# Patient Record
Sex: FEMALE | Race: WHITE
[De-identification: names, ages, dates, MRNs, and addresses within clinical notes are randomized per-mention and may not be internally consistent; named-entity substitution may affect disease eponyms.]

---

## 2018-02-07 ENCOUNTER — HOSPITAL ENCOUNTER (OUTPATIENT)
Dept: HOSPITAL 62 - LAB | Age: 33
End: 2018-02-07
Attending: FAMILY MEDICINE
Payer: COMMERCIAL

## 2018-02-07 DIAGNOSIS — Z32.00: Primary | ICD-10-CM

## 2018-02-07 PROCEDURE — 36415 COLL VENOUS BLD VENIPUNCTURE: CPT

## 2018-02-07 PROCEDURE — 84702 CHORIONIC GONADOTROPIN TEST: CPT

## 2018-02-24 ENCOUNTER — HOSPITAL ENCOUNTER (OUTPATIENT)
Dept: HOSPITAL 62 - OD | Age: 33
End: 2018-02-24
Attending: MIDWIFE
Payer: COMMERCIAL

## 2018-02-24 DIAGNOSIS — N91.2: Primary | ICD-10-CM

## 2018-02-24 PROCEDURE — 36415 COLL VENOUS BLD VENIPUNCTURE: CPT

## 2018-02-24 PROCEDURE — 84702 CHORIONIC GONADOTROPIN TEST: CPT

## 2018-04-02 ENCOUNTER — HOSPITAL ENCOUNTER (OUTPATIENT)
Dept: HOSPITAL 62 - OD | Age: 33
End: 2018-04-02
Attending: OBSTETRICS & GYNECOLOGY
Payer: COMMERCIAL

## 2018-04-02 DIAGNOSIS — Z34.01: Primary | ICD-10-CM

## 2018-04-02 DIAGNOSIS — Z11.3: ICD-10-CM

## 2018-04-02 DIAGNOSIS — Z13.0: ICD-10-CM

## 2018-04-02 DIAGNOSIS — Z13.29: ICD-10-CM

## 2018-04-02 LAB
ADD MANUAL DIFF: NO
BASOPHILS # BLD AUTO: 0 10^3/UL (ref 0–0.2)
BASOPHILS NFR BLD AUTO: 0.2 % (ref 0–2)
EOSINOPHIL # BLD AUTO: 0.1 10^3/UL (ref 0–0.6)
EOSINOPHIL NFR BLD AUTO: 1 % (ref 0–6)
ERYTHROCYTE [DISTWIDTH] IN BLOOD BY AUTOMATED COUNT: 12.6 % (ref 11.5–14)
HCT VFR BLD CALC: 39.2 % (ref 36–47)
HGB BLD-MCNC: 13.9 G/DL (ref 12–15.5)
LYMPHOCYTES # BLD AUTO: 1.2 10^3/UL (ref 0.5–4.7)
LYMPHOCYTES NFR BLD AUTO: 18 % (ref 13–45)
MCH RBC QN AUTO: 30.7 PG (ref 27–33.4)
MCHC RBC AUTO-ENTMCNC: 35.5 G/DL (ref 32–36)
MCV RBC AUTO: 86 FL (ref 80–97)
MONOCYTES # BLD AUTO: 0.6 10^3/UL (ref 0.1–1.4)
MONOCYTES NFR BLD AUTO: 9.4 % (ref 3–13)
NEUTROPHILS # BLD AUTO: 4.9 10^3/UL (ref 1.7–8.2)
NEUTS SEG NFR BLD AUTO: 71.4 % (ref 42–78)
PLATELET # BLD: 304 10^3/UL (ref 150–450)
RBC # BLD AUTO: 4.54 10^6/UL (ref 3.72–5.28)
RUBELLA INTERPRETATION: POSITIVE
RUBV IGG SER-ACNC: 38.1 IU/ML
TOTAL CELLS COUNTED % (AUTO): 100 %
WBC # BLD AUTO: 6.8 10^3/UL (ref 4–10.5)

## 2018-04-02 PROCEDURE — 86701 HIV-1ANTIBODY: CPT

## 2018-04-02 PROCEDURE — 86592 SYPHILIS TEST NON-TREP QUAL: CPT

## 2018-04-02 PROCEDURE — 86804 HEP C AB TEST CONFIRM: CPT

## 2018-04-02 PROCEDURE — 83020 HEMOGLOBIN ELECTROPHORESIS: CPT

## 2018-04-02 PROCEDURE — 85025 COMPLETE CBC W/AUTO DIFF WBC: CPT

## 2018-04-02 PROCEDURE — 86901 BLOOD TYPING SEROLOGIC RH(D): CPT

## 2018-04-02 PROCEDURE — 86900 BLOOD TYPING SEROLOGIC ABO: CPT

## 2018-04-02 PROCEDURE — 86850 RBC ANTIBODY SCREEN: CPT

## 2018-04-02 PROCEDURE — 84443 ASSAY THYROID STIM HORMONE: CPT

## 2018-04-02 PROCEDURE — 84439 ASSAY OF FREE THYROXINE: CPT

## 2018-04-02 PROCEDURE — 36415 COLL VENOUS BLD VENIPUNCTURE: CPT

## 2018-04-02 PROCEDURE — 86803 HEPATITIS C AB TEST: CPT

## 2018-04-02 PROCEDURE — 86762 RUBELLA ANTIBODY: CPT

## 2018-04-02 PROCEDURE — 87340 HEPATITIS B SURFACE AG IA: CPT

## 2018-04-03 LAB
HCV AB SER IA-ACNC: <0.1 S/CO RATIO (ref 0–0.9)
HGB A MFR BLD: 97.1 % (ref 96.4–98.8)
HGB A2 MFR BLD: 2.9 % (ref 1.8–3.2)
HGB C MFR BLD: 0 %
HGB F: 0 % (ref 0–2)
HGB S MFR BLD: 0 %

## 2018-04-12 ENCOUNTER — HOSPITAL ENCOUNTER (OUTPATIENT)
Dept: HOSPITAL 62 - OD | Age: 33
End: 2018-04-12
Attending: MIDWIFE
Payer: COMMERCIAL

## 2018-04-12 DIAGNOSIS — Z36.89: Primary | ICD-10-CM

## 2018-04-12 PROCEDURE — 84702 CHORIONIC GONADOTROPIN TEST: CPT

## 2018-04-12 PROCEDURE — 36415 COLL VENOUS BLD VENIPUNCTURE: CPT

## 2018-04-12 PROCEDURE — 86336 INHIBIN A: CPT

## 2018-04-12 PROCEDURE — 84163 PAPPA SERUM: CPT

## 2018-04-17 LAB
FET CRL US.MEAS: (no result) CM
FET CRL US.MEAS: 70 MM
HCG MOM SERPL: 2.84
HCG SERPL-ACNC: 232.1 IU/ML
INHIBIN A ADJ MOM SERPL: 3.51
INHIBIN A SERPL-MCNC: 804 PG/ML
PAPP-A MOM SERPL: 0.56
PAPP-A SERPL-MCNC: 637.5 NG/ML
TS 18 RISK FETUS: (no result)

## 2018-07-26 ENCOUNTER — HOSPITAL ENCOUNTER (OUTPATIENT)
Dept: HOSPITAL 62 - LAB | Age: 33
End: 2018-07-26
Attending: OBSTETRICS & GYNECOLOGY
Payer: COMMERCIAL

## 2018-07-26 DIAGNOSIS — Z34.03: Primary | ICD-10-CM

## 2018-07-26 PROCEDURE — 86592 SYPHILIS TEST NON-TREP QUAL: CPT

## 2018-07-26 PROCEDURE — 36415 COLL VENOUS BLD VENIPUNCTURE: CPT

## 2018-07-26 PROCEDURE — 86701 HIV-1ANTIBODY: CPT

## 2018-10-19 ENCOUNTER — HOSPITAL ENCOUNTER (OUTPATIENT)
Dept: HOSPITAL 62 - LC | Age: 33
Discharge: HOME | End: 2018-10-19
Attending: OBSTETRICS & GYNECOLOGY
Payer: COMMERCIAL

## 2018-10-19 DIAGNOSIS — Z3A.39: ICD-10-CM

## 2018-10-19 DIAGNOSIS — O14.93: Primary | ICD-10-CM

## 2018-10-19 LAB
ADD MANUAL DIFF: NO
ALBUMIN SERPL-MCNC: 3.1 G/DL (ref 3.5–5)
ALP SERPL-CCNC: 134 U/L (ref 38–126)
ALT SERPL-CCNC: 12 U/L (ref 9–52)
ANION GAP SERPL CALC-SCNC: 10 MMOL/L (ref 5–19)
APPEARANCE UR: (no result)
APTT PPP: YELLOW S
AST SERPL-CCNC: 21 U/L (ref 14–36)
BARBITURATES UR QL SCN: NEGATIVE
BASOPHILS # BLD AUTO: 0 10^3/UL (ref 0–0.2)
BASOPHILS NFR BLD AUTO: 0.4 % (ref 0–2)
BILIRUB DIRECT SERPL-MCNC: 0.1 MG/DL (ref 0–0.4)
BILIRUB SERPL-MCNC: 0.9 MG/DL (ref 0.2–1.3)
BILIRUB UR QL STRIP: NEGATIVE
BUN SERPL-MCNC: 12 MG/DL (ref 7–20)
CALCIUM: 9.9 MG/DL (ref 8.4–10.2)
CHLORIDE SERPL-SCNC: 106 MMOL/L (ref 98–107)
CO2 SERPL-SCNC: 22 MMOL/L (ref 22–30)
CREAT UR-MCNC: 127.2 MG/DL (ref 16–327)
EOSINOPHIL # BLD AUTO: 0 10^3/UL (ref 0–0.6)
EOSINOPHIL NFR BLD AUTO: 0.5 % (ref 0–6)
ERYTHROCYTE [DISTWIDTH] IN BLOOD BY AUTOMATED COUNT: 15.4 % (ref 11.5–14)
GLUCOSE SERPL-MCNC: 74 MG/DL (ref 75–110)
GLUCOSE UR STRIP-MCNC: NEGATIVE MG/DL
HCT VFR BLD CALC: 32.1 % (ref 36–47)
HGB BLD-MCNC: 10.9 G/DL (ref 12–15.5)
KETONES UR STRIP-MCNC: (no result) MG/DL
LYMPHOCYTES # BLD AUTO: 1 10^3/UL (ref 0.5–4.7)
LYMPHOCYTES NFR BLD AUTO: 15.1 % (ref 13–45)
MCH RBC QN AUTO: 28.3 PG (ref 27–33.4)
MCHC RBC AUTO-ENTMCNC: 34 G/DL (ref 32–36)
MCV RBC AUTO: 83 FL (ref 80–97)
METHADONE UR QL SCN: NEGATIVE
MONOCYTES # BLD AUTO: 0.6 10^3/UL (ref 0.1–1.4)
MONOCYTES NFR BLD AUTO: 9.2 % (ref 3–13)
NEUTROPHILS # BLD AUTO: 4.7 10^3/UL (ref 1.7–8.2)
NEUTS SEG NFR BLD AUTO: 74.8 % (ref 42–78)
NITRITE UR QL STRIP: NEGATIVE
PCP UR QL SCN: NEGATIVE
PH UR STRIP: 6 [PH] (ref 5–9)
PLATELET # BLD: 260 10^3/UL (ref 150–450)
POTASSIUM SERPL-SCNC: 5.2 MMOL/L (ref 3.6–5)
PROT SERPL-MCNC: 6 G/DL (ref 6.3–8.2)
PROT UR STRIP-MCNC: 10.6 MG/DL (ref ?–12)
PROT UR STRIP-MCNC: NEGATIVE MG/DL
RBC # BLD AUTO: 3.86 10^6/UL (ref 3.72–5.28)
SODIUM SERPL-SCNC: 137.6 MMOL/L (ref 137–145)
SP GR UR STRIP: 1.02
TOTAL CELLS COUNTED % (AUTO): 100 %
UR PRO/CREAT RATIO RESULT: 0.1 MG/MG (ref 0–0.2)
URATE SERPL-MCNC: 6 MG/DL (ref 2.5–6.2)
URINE AMPHETAMINES SCREEN: NEGATIVE
URINE BENZODIAZEPINES SCREEN: NEGATIVE
URINE COCAINE SCREEN: NEGATIVE
URINE MARIJUANA (THC) SCREEN: NEGATIVE
UROBILINOGEN UR-MCNC: NEGATIVE MG/DL (ref ?–2)
WBC # BLD AUTO: 6.3 10^3/UL (ref 4–10.5)

## 2018-10-19 PROCEDURE — 84156 ASSAY OF PROTEIN URINE: CPT

## 2018-10-19 PROCEDURE — 80307 DRUG TEST PRSMV CHEM ANLYZR: CPT

## 2018-10-19 PROCEDURE — 80053 COMPREHEN METABOLIC PANEL: CPT

## 2018-10-19 PROCEDURE — 36415 COLL VENOUS BLD VENIPUNCTURE: CPT

## 2018-10-19 PROCEDURE — 59025 FETAL NON-STRESS TEST: CPT

## 2018-10-19 PROCEDURE — 4A1HXCZ MONITORING OF PRODUCTS OF CONCEPTION, CARDIAC RATE, EXTERNAL APPROACH: ICD-10-PCS | Performed by: OBSTETRICS & GYNECOLOGY

## 2018-10-19 PROCEDURE — 84550 ASSAY OF BLOOD/URIC ACID: CPT

## 2018-10-19 PROCEDURE — 82570 ASSAY OF URINE CREATININE: CPT

## 2018-10-19 PROCEDURE — 81005 URINALYSIS: CPT

## 2018-10-19 PROCEDURE — 83615 LACTATE (LD) (LDH) ENZYME: CPT

## 2018-10-19 PROCEDURE — 85025 COMPLETE CBC W/AUTO DIFF WBC: CPT

## 2018-10-19 NOTE — NON STRESS TEST REPORT
=================================================================

Non Stress Test

=================================================================

Datetime Report Generated by CPN: 10/19/2018 17:23

   

   

=================================================================

DEMOGRAPHIC

=================================================================

   

EGA NST:  39.1

   

=================================================================

INDICATION

=================================================================

   

Indication for Study:  Ordered by Provider

Indication for Study (NST) Other:  pih work up

   

=================================================================

MONITORING

=================================================================

   

Monitor Explained:  Monitor Explained; Test Explained; Patient

   Verbalized Understanding

Time on Monitor:  10/19/2018 16:25

Time off Monitor:  10/19/2018 16:46

NST Duration:  21

   

=================================================================

NST INTERVENTIONS

=================================================================

   

NST Interventions:  PO Hydration; Reposition Patient

Physician Notified NST:  Dr Younger

BABY A:  [id number]

   

=================================================================

BABY A

=================================================================

   

Fetal Movement :  Present

Contraction Frequency :  irreg

FHR Baseline :  120

Accelerations :  15X15

Decelerations :  None

Variability :  Moderate 6-25bpm

NST Review:  Meets Criteria for Reactive NST

NST Review and Verified By :  SHAYY Bellavance RN

NST Results:  Reactive

   

=================================================================

NST REPORT

=================================================================

   

Report Trigger:  Send Report

## 2018-10-19 NOTE — L&D PROGRESS NOTES
=================================================================

PROGRESS NOTES

=================================================================

Datetime Report Generated by CPN: 10/19/2018 16:10

   

   

=================================================================

PROGRESS NOTE

=================================================================

   

Impression Other:  elevated BP in office

Procedures- Other:  PIH work up

Plan:  Continue Present Management

Plan Other:  labs/monitor

Vital Signs :  Reviewed; Within Normal Limits

Vital Signs Comments:  ALL BPs normal

Comment:  All PIH labs normal; no elevated BPs in triage; pt

   asymptomatic

   

=================================================================

VAGINAL EXAM

=================================================================

   

Contractions:  irregular

   

=================================================================

FETUS A

=================================================================

   

FHR - Baseline:  120s

Variability:  Moderate 6-25bpm

Decelerations:  None

FHR Category:  Category I

:  39.1

   

=================================================================

SIGNATURE

=================================================================

   

SIGNATURE:  [id number]

Signature:  Electronically signed by Carly Jeffery MD ([email or web address]) on

   10/19/2018 at 16:09  with User ID: [email or web address]

## 2018-10-25 ENCOUNTER — HOSPITAL ENCOUNTER (INPATIENT)
Dept: HOSPITAL 62 - LR | Age: 33
LOS: 4 days | Discharge: HOME | End: 2018-10-29
Attending: OBSTETRICS & GYNECOLOGY | Admitting: OBSTETRICS & GYNECOLOGY
Payer: COMMERCIAL

## 2018-10-25 DIAGNOSIS — O48.0: Primary | ICD-10-CM

## 2018-10-25 DIAGNOSIS — D62: ICD-10-CM

## 2018-10-25 DIAGNOSIS — Z3A.40: ICD-10-CM

## 2018-10-25 DIAGNOSIS — O40.3XX0: ICD-10-CM

## 2018-10-25 LAB
ADD MANUAL DIFF: NO
BARBITURATES UR QL SCN: NEGATIVE
BASOPHILS # BLD AUTO: 0 10^3/UL (ref 0–0.2)
BASOPHILS NFR BLD AUTO: 0.4 % (ref 0–2)
EOSINOPHIL # BLD AUTO: 0 10^3/UL (ref 0–0.6)
EOSINOPHIL NFR BLD AUTO: 0.5 % (ref 0–6)
ERYTHROCYTE [DISTWIDTH] IN BLOOD BY AUTOMATED COUNT: 15.5 % (ref 11.5–14)
HCT VFR BLD CALC: 33.9 % (ref 36–47)
HGB BLD-MCNC: 11.6 G/DL (ref 12–15.5)
LYMPHOCYTES # BLD AUTO: 1.1 10^3/UL (ref 0.5–4.7)
LYMPHOCYTES NFR BLD AUTO: 14.6 % (ref 13–45)
MCH RBC QN AUTO: 28.6 PG (ref 27–33.4)
MCHC RBC AUTO-ENTMCNC: 34.4 G/DL (ref 32–36)
MCV RBC AUTO: 83 FL (ref 80–97)
METHADONE UR QL SCN: NEGATIVE
MONOCYTES # BLD AUTO: 0.8 10^3/UL (ref 0.1–1.4)
MONOCYTES NFR BLD AUTO: 9.8 % (ref 3–13)
NEUTROPHILS # BLD AUTO: 5.8 10^3/UL (ref 1.7–8.2)
NEUTS SEG NFR BLD AUTO: 74.7 % (ref 42–78)
PCP UR QL SCN: NEGATIVE
PLATELET # BLD: 283 10^3/UL (ref 150–450)
RBC # BLD AUTO: 4.07 10^6/UL (ref 3.72–5.28)
TOTAL CELLS COUNTED % (AUTO): 100 %
URINE AMPHETAMINES SCREEN: NEGATIVE
URINE BENZODIAZEPINES SCREEN: NEGATIVE
URINE COCAINE SCREEN: NEGATIVE
URINE MARIJUANA (THC) SCREEN: NEGATIVE
WBC # BLD AUTO: 7.7 10^3/UL (ref 4–10.5)

## 2018-10-25 PROCEDURE — 86850 RBC ANTIBODY SCREEN: CPT

## 2018-10-25 PROCEDURE — 86900 BLOOD TYPING SEROLOGIC ABO: CPT

## 2018-10-25 PROCEDURE — 86901 BLOOD TYPING SEROLOGIC RH(D): CPT

## 2018-10-25 PROCEDURE — 3E0P7VZ INTRODUCTION OF HORMONE INTO FEMALE REPRODUCTIVE, VIA NATURAL OR ARTIFICIAL OPENING: ICD-10-PCS | Performed by: OBSTETRICS & GYNECOLOGY

## 2018-10-25 PROCEDURE — 85025 COMPLETE CBC W/AUTO DIFF WBC: CPT

## 2018-10-25 PROCEDURE — 94760 N-INVAS EAR/PLS OXIMETRY 1: CPT

## 2018-10-25 PROCEDURE — 80307 DRUG TEST PRSMV CHEM ANLYZR: CPT

## 2018-10-25 PROCEDURE — 36415 COLL VENOUS BLD VENIPUNCTURE: CPT

## 2018-10-25 PROCEDURE — 88307 TISSUE EXAM BY PATHOLOGIST: CPT

## 2018-10-25 PROCEDURE — 4A1HXCZ MONITORING OF PRODUCTS OF CONCEPTION, CARDIAC RATE, EXTERNAL APPROACH: ICD-10-PCS | Performed by: OBSTETRICS & GYNECOLOGY

## 2018-10-25 PROCEDURE — 85027 COMPLETE CBC AUTOMATED: CPT

## 2018-10-25 PROCEDURE — 86592 SYPHILIS TEST NON-TREP QUAL: CPT

## 2018-10-26 PROCEDURE — 0HQ9XZZ REPAIR PERINEUM SKIN, EXTERNAL APPROACH: ICD-10-PCS | Performed by: OBSTETRICS & GYNECOLOGY

## 2018-10-26 RX ADMIN — SODIUM CHLORIDE, SODIUM LACTATE, POTASSIUM CHLORIDE, AND CALCIUM CHLORIDE PRN MLS/HR: .6; .31; .03; .02 INJECTION, SOLUTION INTRAVENOUS at 22:50

## 2018-10-26 RX ADMIN — SODIUM CHLORIDE, SODIUM LACTATE, POTASSIUM CHLORIDE, AND CALCIUM CHLORIDE PRN MLS/HR: .6; .31; .03; .02 INJECTION, SOLUTION INTRAVENOUS at 11:08

## 2018-10-26 NOTE — L&D PROGRESS NOTES
=================================================================

PROGRESS NOTES

=================================================================

Datetime Report Generated by CPN: 10/26/2018 08:25

   

   

=================================================================

PROGRESS NOTE

=================================================================

   

Impression:  Reassuring Fetal Heart Rate

Plan:  Cervical Ripening

Comment:  sono to check for presentation = vtx, cervidil remains in

   place, Cat 1 strip, comfortable, POC discussed

   

=================================================================

SIGNATURE

=================================================================

   

SIGNATURE:  [id number];[id number]

SIGNATURE:  [id number];[id number]

Assignment:  Earle Worthington MD

Signature:  Electronically signed by Kacy Larson CNM on 10/26/2018 at

   08:24  with User ID: [email or web address]

:  Electronically signed by Kacy Larson CNM on 10/26/2018 at 08:24  with

   User ID: [email or web address]

## 2018-10-26 NOTE — ADMISSION PHYSICAL
=================================================================



=================================================================

Datetime Report Generated by CPN: 10/26/2018 08:50

   

   

=================================================================

CURRENT ADMISSION

=================================================================

   

Prenatal Hx Assessment:  The Prenatal History has been Reviewed and is

   Current

Chief Complaint:  Scheduled Induction of Labor

Indication for Induction:  Gestational HTN; Indicated by Fetal Testing;

   Polyhydramnios

Admit Impression :  Postterm, Intrauterine Pregnancy; No Active Labor

Admit Plan:  Admit to Unit; Initiate Labor Induction Protocol

   

=================================================================

ALLERGIES

=================================================================

   

Medication Allergies:  No

Medication Allergies:  fish derived (10/19/2018)

Latex:  No Latex Allergies

Food Allergies:  fish

   

=================================================================

OBSTETRICAL HISTORY

=================================================================

   

EDC:  10/25/2018 00:00

:  1

Para:  0

Term:  0

:  0

SAB:  0

IAB:  0

Ectopic:  0

Livin

Cesareans:  0

VBACs:  0

Multiple Births:  0

Gestational Diabetes:  No

Rh Sensitization:  No

Incompetent Cervix:  No

MAMTA:  No

Infertility:  No

ART Treatment:  No

Uterine Anomaly:  No

IUGR:  No

Hx Previous C/S:  No

Macrosomia:  No

Hx Loss/Stillborn:  No

PIH:  No

Hx  Death:  No

Placenta Previa/Abruption:  No

Depression/PP Depression:  No

PTL/PROM:  No

Post Partum Hemorrhage:  No

Current Pregnancy Procedures:  Ultrasound; NST

   

=================================================================

***SEE PRENATAL RECORDS***

=================================================================

   

Alcohol:  No

Marijuana :  No

Cocaine:  No

Other Illicit Drugs:  No

Cigarettes:  Never Smoker. [id number]

   

=================================================================

MEDICAL HISTORY

=================================================================

   

Diabetes:  No

Blood Transfusion:  No

Pulmonary Disease (Asthma, TB):  No

Breast Disease:  No

Hypertension:  No

Gyn Surgery:  No

Heart Disease:  No

Hosp/Surgery:  Yes

Autoimmune Disorder:  No

Anesthetic Complications:  No

Kidney Disease:  No

Abnormal Pap Smear:  Yes

Neuro/Epilepsy:  No

Psychiatric Disorders:  No

Other Medical Diseases:  No

Hepatitis/Liver Disease:  No

Significant Family History:  No

Varicosities/Phlebitis:  No

Trauma/Violence :  No

Thyroid Dysfunction:  No

Medical History Comments:  Appendix removed 

   gall baldder removed  

   abnormal PAP 11 years ago  follow up normal

   

=================================================================

INFECTIOUS HISTORY

=================================================================

   

Gonorrhea:  No

Genital Herpes:  No

Chlamydia:  No

Tuberculosis:  No

Syphilis:  No

Hepatitis:  No

HIV/AIDS Exposure:  No

Rash or Viral Illness:  No

HPV:  No

   

=================================================================

PHYSICAL EXAM

=================================================================

   

General:  Normal

HEENT:  Normal

Neurologic:  Normal

Thyroid:  Normal

Heart:  Normal

Lungs:  Normal

Breast:  Deferred

Back:  Normal

Abdomen:  Normal

Genitourinary Exam:  Normal

Extremities:  Normal

DTRs:  Normal

Pelvic Type:  Adequate

Physical Exam Comments:  Polyhydramnios

   Abn nuchal, Informaseque nl

   GBS neg

   Labile BP's 

Vital Signs:  Reviewed

   

=================================================================

VAGINAL EXAM

=================================================================

   

Contraction Comments:  irregular

   

=================================================================

MEMBRANES

=================================================================

   

Membranes:  Intact

   

=================================================================

FETUS A

=================================================================

   

EGA:  40.1

Monitoring:  External US

Admit Comment:  Admitted to LD for IOL 40 weeks. labile BP's and Poly

   Cervidil placed

   Plan:  Pitocin later after Cervidil pulled

   POC discussed

   

=================================================================

PLANS FOR LABOR AND DELIVERY

=================================================================

   

Labor and Delivery:  None

Pain Management:  Epidural

Feeding Preference:  Breast

Benefit of Breast Feed Discussed:  Yes

Circumcision:  N/A

   

=================================================================

INFORMED CONSENT

=================================================================

   

Assignment:  Earle Worthington MD

Signature:  Electronically signed by Kacy Larson CNM on 10/26/2018 at

   08:49  with User ID: [email or web address]

:  Electronically signed by Kacy Larson CNM on 10/26/2018 at 08:49  with

   User ID: [email or web address]

## 2018-10-26 NOTE — L&D PROGRESS NOTES
=================================================================

PROGRESS NOTES

=================================================================

Datetime Report Generated by CPN: 10/26/2018 12:50

   

   

=================================================================

PROGRESS NOTE

=================================================================

   

Impression:  Reassuring Fetal Heart Rate

Plan:  Continue Present Management; Induction

Vital Signs :  Reviewed; Within Normal Limits

Comment:  Pitocin infusing, mild uc's, uc's q 2-3 min, Cat 1, hsb at

   BS, no c/o

   reviewed POC

   

=================================================================

MEMBRANES

=================================================================

   

Membranes:  Intact

   

=================================================================

FETUS A

=================================================================

   

:  40.0

   

=================================================================

FETUS C

=================================================================

   

SIGNATURE:  [id number];[id number];[id number]

SIGNATURE:  [id number];[id number];[id number]

Assignment:  Earle Worthington MD

Signature:  Electronically signed by Kacy Larson CNM on 10/26/2018 at

   12:50  with User ID: [email or web address]

:  Electronically signed by Kacy Larson CNM on 10/26/2018 at 12:50  with

   User ID: [email or web address]

## 2018-10-27 LAB
ADD MANUAL DIFF: NO
BASOPHILS # BLD AUTO: 0 10^3/UL (ref 0–0.2)
BASOPHILS NFR BLD AUTO: 0.3 % (ref 0–2)
EOSINOPHIL # BLD AUTO: 0 10^3/UL (ref 0–0.6)
EOSINOPHIL NFR BLD AUTO: 0.7 % (ref 0–6)
ERYTHROCYTE [DISTWIDTH] IN BLOOD BY AUTOMATED COUNT: 15.8 % (ref 11.5–14)
HCT VFR BLD CALC: 32.6 % (ref 36–47)
HGB BLD-MCNC: 11 G/DL (ref 12–15.5)
LYMPHOCYTES # BLD AUTO: 1 10^3/UL (ref 0.5–4.7)
LYMPHOCYTES NFR BLD AUTO: 14.7 % (ref 13–45)
MCH RBC QN AUTO: 28.1 PG (ref 27–33.4)
MCHC RBC AUTO-ENTMCNC: 33.7 G/DL (ref 32–36)
MCV RBC AUTO: 83 FL (ref 80–97)
MONOCYTES # BLD AUTO: 0.7 10^3/UL (ref 0.1–1.4)
MONOCYTES NFR BLD AUTO: 9.7 % (ref 3–13)
NEUTROPHILS # BLD AUTO: 5 10^3/UL (ref 1.7–8.2)
NEUTS SEG NFR BLD AUTO: 74.6 % (ref 42–78)
PLATELET # BLD: 255 10^3/UL (ref 150–450)
RBC # BLD AUTO: 3.9 10^6/UL (ref 3.72–5.28)
TOTAL CELLS COUNTED % (AUTO): 100 %
WBC # BLD AUTO: 6.7 10^3/UL (ref 4–10.5)

## 2018-10-27 PROCEDURE — 3E033VJ INTRODUCTION OF OTHER HORMONE INTO PERIPHERAL VEIN, PERCUTANEOUS APPROACH: ICD-10-PCS | Performed by: OBSTETRICS & GYNECOLOGY

## 2018-10-27 PROCEDURE — 10907ZC DRAINAGE OF AMNIOTIC FLUID, THERAPEUTIC FROM PRODUCTS OF CONCEPTION, VIA NATURAL OR ARTIFICIAL OPENING: ICD-10-PCS | Performed by: OBSTETRICS & GYNECOLOGY

## 2018-10-27 RX ADMIN — SODIUM CHLORIDE, SODIUM LACTATE, POTASSIUM CHLORIDE, AND CALCIUM CHLORIDE PRN MLS/HR: .6; .31; .03; .02 INJECTION, SOLUTION INTRAVENOUS at 11:04

## 2018-10-27 RX ADMIN — FAMOTIDINE SCH: 20 TABLET, FILM COATED ORAL at 22:39

## 2018-10-27 RX ADMIN — SODIUM CHLORIDE, SODIUM LACTATE, POTASSIUM CHLORIDE, AND CALCIUM CHLORIDE PRN MLS/HR: .6; .31; .03; .02 INJECTION, SOLUTION INTRAVENOUS at 09:32

## 2018-10-27 RX ADMIN — SODIUM CHLORIDE, SODIUM LACTATE, POTASSIUM CHLORIDE, AND CALCIUM CHLORIDE PRN MLS/HR: .6; .31; .03; .02 INJECTION, SOLUTION INTRAVENOUS at 13:06

## 2018-10-27 NOTE — PDOC DELIVERY SUMMARY
Delivery Summary





- Maternal


Hx : I


Hx Para: 0


Hx # Term Pregnancies: 0


Hx #  Pregnancies: 0


Hx Total # of Abortions (Sponateous & Elective): 0


Number of Living Children: 0


BOYD: 10/25/18


Gestational Age: 40.2


Prenatal Risk Factors: Polyhydramnios


Ruptured Membranes: AROM


Fluids: Clear, Poly Hydramnios





- Delivery


Labor: Induction


Presentation: Vertex


Fetal Heart Rate Monitoring: Externally


Uterine Contraction Monitoring: External


Support Person Present: Yes


Location: LD


Placenta: Within Normal Limits - normal appearing


Number of Vessels (Cord): 3


Nuchal Cord: No


Delivery of Placenta Date: 10/27/18


Estimated Blood Loss: 800 ml





- Medications


Type of Anesthesia:: Epidural





- **Delivery Medications


Delivery Meds: Cytotec 800mcg Per Rectum/Vagina - given both cytotec and 

methergine





- Delivery Personnel


MD: DEBBIE CONTRERAS

## 2018-10-28 LAB
ERYTHROCYTE [DISTWIDTH] IN BLOOD BY AUTOMATED COUNT: 16 % (ref 11.5–14)
HCT VFR BLD CALC: 25.7 % (ref 36–47)
HGB BLD-MCNC: 8.9 G/DL (ref 12–15.5)
MCH RBC QN AUTO: 28.6 PG (ref 27–33.4)
MCHC RBC AUTO-ENTMCNC: 34.5 G/DL (ref 32–36)
MCV RBC AUTO: 83 FL (ref 80–97)
PLATELET # BLD: 205 10^3/UL (ref 150–450)
RBC # BLD AUTO: 3.1 10^6/UL (ref 3.72–5.28)
WBC # BLD AUTO: 13.9 10^3/UL (ref 4–10.5)

## 2018-10-28 RX ADMIN — FERROUS SULFATE TAB 325 MG (65 MG ELEMENTAL FE) SCH MG: 325 (65 FE) TAB at 10:48

## 2018-10-28 RX ADMIN — DOCUSATE SODIUM SCH MG: 100 CAPSULE, LIQUID FILLED ORAL at 10:48

## 2018-10-28 RX ADMIN — IBUPROFEN SCH MG: 800 TABLET, FILM COATED ORAL at 14:56

## 2018-10-28 RX ADMIN — IBUPROFEN SCH MG: 800 TABLET, FILM COATED ORAL at 06:36

## 2018-10-28 RX ADMIN — FAMOTIDINE SCH MG: 20 TABLET, FILM COATED ORAL at 10:48

## 2018-10-28 RX ADMIN — Medication SCH CAP: at 10:48

## 2018-10-28 RX ADMIN — FERROUS SULFATE TAB 325 MG (65 MG ELEMENTAL FE) SCH MG: 325 (65 FE) TAB at 18:24

## 2018-10-28 RX ADMIN — SENNOSIDES, DOCUSATE SODIUM SCH EACH: 50; 8.6 TABLET, FILM COATED ORAL at 10:48

## 2018-10-28 RX ADMIN — DOCUSATE SODIUM SCH MG: 100 CAPSULE, LIQUID FILLED ORAL at 18:24

## 2018-10-28 RX ADMIN — IBUPROFEN SCH MG: 800 TABLET, FILM COATED ORAL at 21:06

## 2018-10-28 RX ADMIN — IBUPROFEN SCH MG: 800 TABLET, FILM COATED ORAL at 00:50

## 2018-10-28 RX ADMIN — FAMOTIDINE SCH: 20 TABLET, FILM COATED ORAL at 21:06

## 2018-10-28 RX ADMIN — IBUPROFEN SCH: 800 TABLET, FILM COATED ORAL at 00:41

## 2018-10-28 NOTE — DELIVERY SUMMARY
=================================================================

Del Sum A-C

=================================================================

Datetime Report Generated by CPN: 10/28/2018 01:23

   

   

=================================================================

DELIVERY PERSONNEL

=================================================================

   

DELIVERY PERSONNEL:  [id number]

Delivery Doctor::  Kristin Alejo MD

Labor and Delivery Nurse::  Doris Bojorquez RN

Labor and Delivery Nurse::  MONTSERRAT Royal

Scrub Tech/CNA:  Anniaessence SantosZamora, ST

   

=================================================================

MATERNAL INFORMATION

=================================================================

   

Delivery Anesthesia:  Epidural

Medications During Delivery:  Methergine, rectal Cytotec 800 mcg

Medications After Delivery:  Pitocin Drip 20 Units/1000ml NSS;

   Methergine 0.2mg IM; Cytotec 800mcg Per Rectum/Vagina

Meds After Delivery Comment:  Ampicillin 2 ml IV

   Gentamicin 80 mg IV

Estimated  Blood Loss (ml):  800

Maternal Complications:  None; Other

Complication Details:  Polyhydraminos (30+)

   

=================================================================

LABOR SUMMARY

=================================================================

   

EDC:  10/25/2018 00:00

No. Babies in Womb:  1

 Attempted:  No

Labor Anesthesia:  Epidural

   

=================================================================

LABOR INFORMATION

=================================================================

   

Reason for Induction:  Polyhydramnios

Onset of Labor:  10/27/2018 08:16

Complete Dilatation:  10/27/2018 15:55

Cervical Ripening Agents:  Cervidil

Oxytocin:  Induction

Group B Beta Strep:  Negative

Antibiotics # of Doses:  n/a

Antibiotics Time of Last Dose:  n/a

Name of Antibiotic Given:  n/a

Steroids Given:  None

Reason Steroids Not Administered:  Not Applicable

   

=================================================================

MEMBRANES

=================================================================

   

Membranes Rupture Method:  Artificial

Rupture of Membranes:  10/27/2018 08:16

Length of Rupture (hr):  10.22

Amniotic Fluid Color:  Clear

Amniotic Fluid Amount:  Large

Amniotic Fluid Odor:  Normal

   

=================================================================

STAGES OF LABOR

=================================================================

   

Stage 1 hr:  7

Stage 1 min:  39

Stage 2 hr:  2

Stage 2 min:  34

Stage 3 hr:  0

Stage 3 min:  15

Total Time in Labor hr:  10

Total Time in Labor min:  28

   

=================================================================

VAGINAL DELIVERY

=================================================================

   

Episiotomy:  None

Laceration #1:  Perineal

Laceration Extension #1:  First Degree

Laceration #2:  Periurethral

Laceration Extension #2:  First Degree

Laceration Repair:  Yes

Laceration Repair Note:  3-0 vicryl

Sponge Count Correct:  N/A

Sharps Count Correct:  N/A

   

=================================================================

CSECTION DELIVERY

=================================================================

   

Primary Indication:  N/A

Secondary Indication:  N/A

CSection Incidence:  N/A

Labor:  N/A

Elective:  N/A

CSection Incision:  N/A

   

=================================================================

BABY A INFORMATION

=================================================================

   

Infant Delivery Date/Time:  10/27/2018 18:29

Method of Delivery:  Vaginal

Born in Route :  No

:  N/A

Forceps:  N/A

Vacuum Extraction:  N/A

Shoulder Dystocia :  No

   

=================================================================

PRESENTATION/POSITION BABY A

=================================================================

   

Presentation:  Cephalic

Cephalic Presentation:  Vertex

Vertex Position:  Right Occipital Anterior

Breech Presentation:  N/A

   

=================================================================

PLACENTA INFORMATION BABY A

=================================================================

   

Placenta Delivery Time :  10/27/2018 18:44

Placenta Method of Delivery:  Spontaneous

Placenta Status:  Delivered

   

=================================================================

APGAR SCORES BABY A

=================================================================

   

Heart Rate 1 min:  >100 bpm

Resp Effort 1 min:  Good Cry

Reflex Irritability 1 min:  Cough or Sneeze or Pulls Away

Muscle Tone 1 min:  Some Flexion of Extremities

Color 1 min:  Body Pink, Extremities Blue

Resuscitation Effort 1 min:  Tactile Stimulation

APGAR SCORE 1 MIN:  8

Heart Rate 5 min:  >100 bpm

Resp Effort 5 min:  Good Cry

Reflex Irritability 5 min:  Cough or Sneeze or Pulls Away

Muscle Tone 5 min:  Active Motion

Color 5 min:  Body Pink, Extremities Blue

Resuscitation Effort 5 min:  N/A

APGAR SCORE 5 MIN:  9

   

=================================================================

INFANT INFORMATION BABY A

=================================================================

   

Gestational Age at Delivery:  40.2

Gestational Status:  Full Term- 39- 40.6 Weeks

Infant Outcome :  Liveborn

Infant Condition :  Stable

Infant Sex:  Female

   

=================================================================

IDENTIFICATION BABY A

=================================================================

   

Infant Verification Date/Time:  10/27/2018 19:56

ID Band Number:  [id number]

Mother's Name Verified:  Yes

Infant Medical Record Number:  [id number]

RN Verifying Infant:  A. Silviayak, RN

Additional Verifying Personnel:  N. Puga, RN

   

=================================================================

WEIGHT/LENGTH BABY A

=================================================================

   

Infant Birthweight (gm):  3850

Infant Weight (lb):  8

Infant Weight (oz):  8

Infant Length (in):  20.50

Infant Length (cm):  52.07

   

=================================================================

CORD INFORMATION BABY A

=================================================================

   

No. Cord Vessels:  3

Nuchal Cord :  N/A

Cord Blood Taken:  Yes-For Eval (Mom's Blood Type - or O+)

Infant Suction:  Mouth

   

=================================================================

ASSESSMENT BABY A

=================================================================

   

Infant Complications:  Polyhydramnios

Physical Findings at Delivery:  Molding of the Head

Infant Respirations:  Appears Normal

Skin to Skin:  Yes

Skin to Skin Time (min):  75

Neonatologist/ALS Called :  No

   

=================================================================

BABY B INFORMATION

=================================================================

   

 :  N/A

   

=================================================================

SIGNATURES

=================================================================

   

Signature:  Electronically signed by Amina Alejo MD ([email or web address]) on

   10/27/2018 at 19:03  with User ID: [email or web address]

## 2018-10-28 NOTE — PDOC PROGRESS REPORT
Subjective-OB


Progress Note for:: 10/28/18


Subjective: 





Doing well, no c/o, feeling better, voiding, mod bleeding





Physical Exam (OB)


Vital Signs: 


 











Temp Pulse Resp BP Pulse Ox


 


 97.8 F   87   18   115/72   97 


 


 10/28/18 07:58  10/28/18 07:58  10/28/18 07:58  10/28/18 07:58  10/28/18 07:58








 Intake & Output











 10/27/18 10/28/18 10/29/18





 06:59 06:59 06:59


 


Intake Total 1036 2446 


 


Balance 1036 2446 


 


Weight 80.739 kg  














- PIH/Pre-Eclampsia


DTR's: 1 +


Clonus: Negative


Headache: Absent


Epigastric Pain: No


Visual Changes: No





- Lochia


Lochia Amount: Scant < 10 ml


Lochia Color: Rubra/Red





- Abdomen


Description: Tender, Soft


Hernia Present: No


Fundal Description: Firm, Midline


Fundal Height: u/u - u/2





Objective-Diagnostic


Laboratory: 


 





 10/28/18 08:09 





 











  10/28/18





  08:09


 


WBC  13.9 H D


 


RBC  3.10 L


 


Hgb  8.9 L D


 


Hct  25.7 L


 


MCV  83


 


MCH  28.6


 


MCHC  34.5


 


RDW  16.0 H


 


Plt Count  205














Assessment and Plan(PN)





- Assessment and Plan


(1) Polyhydramnios affecting pregnancy in third trimester


Is this a current diagnosis for this admission?: Yes   





(2) Delivery normal


Is this a current diagnosis for this admission?: Yes   





(3) Anemia


Qualifiers: 


   Other causes of anemia: acute posthemorrhagic 


Is this a current diagnosis for this admission?: Yes   





- Time Spent with Patient


Time with patient: Less than 15 minutes


Medications reviewed and adjusted accordingly: Yes





- Disposition


Anticipated Discharge: Home


Within: within 48 hours

## 2018-10-29 VITALS — SYSTOLIC BLOOD PRESSURE: 116 MMHG | DIASTOLIC BLOOD PRESSURE: 67 MMHG

## 2018-10-29 RX ADMIN — DOCUSATE SODIUM SCH MG: 100 CAPSULE, LIQUID FILLED ORAL at 09:47

## 2018-10-29 RX ADMIN — Medication SCH CAP: at 09:46

## 2018-10-29 RX ADMIN — IBUPROFEN SCH MG: 800 TABLET, FILM COATED ORAL at 05:38

## 2018-10-29 RX ADMIN — FERROUS SULFATE TAB 325 MG (65 MG ELEMENTAL FE) SCH MG: 325 (65 FE) TAB at 09:47

## 2018-10-29 RX ADMIN — FAMOTIDINE SCH MG: 20 TABLET, FILM COATED ORAL at 09:47

## 2018-10-29 RX ADMIN — SENNOSIDES, DOCUSATE SODIUM SCH EACH: 50; 8.6 TABLET, FILM COATED ORAL at 09:47

## 2018-10-29 RX ADMIN — IBUPROFEN SCH MG: 800 TABLET, FILM COATED ORAL at 13:17

## 2018-10-29 NOTE — PDOC DISCHARGE SUMMARY
Final Diagnosis


Discharge Date: 10/29/18 - PP Day #2, doing well, O+, Rubella Immune, 

Breastfeeding





- Final Diagnosis


(1) Anemia


Is this a current diagnosis for this admission?: Yes   





(2) Delivery normal


Is this a current diagnosis for this admission?: Yes   





(3) Polyhydramnios affecting pregnancy in third trimester


Is this a current diagnosis for this admission?: Yes   





Discharge Data





- Discharge Medication


Prescriptions: 


Ibuprofen [Motrin 800 mg Tablet] 800 mg PO Q8 #60 tablet


Home Medications: 








Ferrous Sulfate [Iron] 325 mg PO DAILY 10/19/18 


Prenatal Vit,Calc76/Iron/Folic [Prenatabs Rx Tablet] 1 each PO DAILY 10/19/18 


Ibuprofen [Motrin 800 mg Tablet] 800 mg PO Q8 #60 tablet 10/29/18 








Reason(s) for Admission: Medical Complications - Polyhydramnios


Prenatal Procedures: NST, Ultrasound


Intrapartum Procedure(s): Spontaneous Vaginal Delivery


Postpartum Complication(s): Laceration-Perineal, Hemorrhage-Uterine Atony


Laceration-Degree: 1st





- Diagnosis Test


Laboratory: 


 











Temp Pulse Resp BP Pulse Ox


 


 98.2 F   88   16   116/67   98 


 


 10/29/18 09:21  10/29/18 09:21  10/29/18 09:21  10/29/18 09:21  10/29/18 09:21








 











  10/25/18 10/25/18 10/27/18





  17:24 17:27 08:55


 


RBC  4.07   3.90


 


Hgb  11.6 L   11.0 L


 


Hct  33.9 L   32.6 L


 


Urine Opiates Screen   NEGATIVE 














  10/28/18





  08:09


 


RBC  3.10 L


 


Hgb  8.9 L D


 


Hct  25.7 L


 


Urine Opiates Screen 














- Discharge information/Instructions


Discharge Activity: Activity As Tolerated, Balance Activity w/Rest, No Lifting 

Over 10 Pounds, No Lifting/Push/Pulling, Pelvic Rest, No tub bath


Discharge Diet: As Tolerated, Regular


Disposition: HOME, SELF-CARE


Follow up with: [facility]


in: 4, Weeks

## 2019-07-09 ENCOUNTER — HOSPITAL ENCOUNTER (OUTPATIENT)
Dept: HOSPITAL 62 - SC | Age: 34
Discharge: HOME | End: 2019-07-09
Attending: OTOLARYNGOLOGY
Payer: COMMERCIAL

## 2019-07-09 DIAGNOSIS — J35.8: ICD-10-CM

## 2019-07-09 DIAGNOSIS — J35.01: Primary | ICD-10-CM

## 2019-07-09 PROCEDURE — 88304 TISSUE EXAM BY PATHOLOGIST: CPT

## 2019-07-09 PROCEDURE — 42826 REMOVAL OF TONSILS: CPT

## 2019-07-09 NOTE — [facility] OPERATIVE REPORT E
[facility] Operative [facility]



NAME: KISHAN JIMENEZ

                                      MRN: [id number]

                                      AGE: 33Y

DATE OF SURGERY: 07/09/2019          ROOM:



HISTORY:

This 33-year-old female with a history of chronic tonsillitis presents

today for a tonsillectomy.  Informed consent was obtained from the patient.



PREOPERATIVE DIAGNOSIS:

Chronic tonsillitis.



POSTOPERATIVE DIAGNOSIS:

Chronic tonsillitis.



OPERATION:

Tonsillectomy.



SURGEON:

RENATA RUIZ MD



ANESTHESIA:

General via endotracheal intubation.



DESCRIPTION OF PROCEDURE:

After receiving informed consent from the patient, she was taken to the

operating room and placed supine on the operating table.  After successful

induction and intubation by Anesthesia, the patient was then turned 90

degrees, placed in Trendelenburg, a shoulder roll placed, head drape

placed, and McIvor mouth gag inserted atraumatically into the oral cavity

and then opened up.  The soft palate was palpated and found to be normal. 

Next, red catheters were inserted down each nasal cavity and brought out to

elevate the soft palate.  The right tonsil was grasped with a tonsil

tenaculum, pulled medially, and dissected free from its tonsillar fossa

using Bovie electrocautery.  Hemostasis was obtained with suction Bovie

electrocautery.  A similar procedure was done on the left side.  Both

tonsils were removed.  The tonsils were 1+ in size.  Next, the nasopharynx

along with the oral cavity and oropharynx were irrigated with copious

amounts of normal saline.  No bleeding was noted.  Orogastric tube was

inserted into the stomach and gastric contents were aspirated.  The McIvor

mouth gag was then let down, reopened, and no bleeding was noted.  This

along with the red catheters were removed from the patient.  The patient

was given back to Anesthesia who successfully extubated the patient without

any complications.  Estimated blood loss was 100 mL, fluids 800 mL

crystalloid.  The patient was then transferred to the postanesthesia care

unit in stable condition, spontaneous respirations, no complications.



DICTATING PHYSICIAN: RENATA RUIZ M.D.



[id number]              DT: 07/09/2019 1336

PHY#: [id number]         DD: 07/09/2019 1323

ID:   [id number]               JOB#: [id number]       ACCT: [id number]



cc:RENATA RUIZ MD

>